# Patient Record
Sex: FEMALE | Race: WHITE
[De-identification: names, ages, dates, MRNs, and addresses within clinical notes are randomized per-mention and may not be internally consistent; named-entity substitution may affect disease eponyms.]

---

## 2019-04-04 ENCOUNTER — HOSPITAL ENCOUNTER (OUTPATIENT)
Dept: HOSPITAL 35 - TBA | Age: 54
Discharge: HOME | End: 2019-04-04
Attending: ORTHOPAEDIC SURGERY
Payer: COMMERCIAL

## 2019-04-04 VITALS — SYSTOLIC BLOOD PRESSURE: 120 MMHG | DIASTOLIC BLOOD PRESSURE: 49 MMHG

## 2019-04-04 VITALS — DIASTOLIC BLOOD PRESSURE: 83 MMHG | SYSTOLIC BLOOD PRESSURE: 139 MMHG

## 2019-04-04 VITALS — SYSTOLIC BLOOD PRESSURE: 119 MMHG | DIASTOLIC BLOOD PRESSURE: 54 MMHG

## 2019-04-04 VITALS — HEIGHT: 66 IN | BODY MASS INDEX: 28.77 KG/M2 | WEIGHT: 179 LBS

## 2019-04-04 DIAGNOSIS — Z98.890: ICD-10-CM

## 2019-04-04 DIAGNOSIS — Z90.710: ICD-10-CM

## 2019-04-04 DIAGNOSIS — H81.09: ICD-10-CM

## 2019-04-04 DIAGNOSIS — M51.26: Primary | ICD-10-CM

## 2019-04-04 DIAGNOSIS — I10: ICD-10-CM

## 2019-04-04 LAB
ANION GAP SERPL CALC-SCNC: 10 MMOL/L (ref 7–16)
BUN SERPL-MCNC: 13 MG/DL (ref 7–18)
CALCIUM SERPL-MCNC: 9 MG/DL (ref 8.5–10.1)
CHLORIDE SERPL-SCNC: 106 MMOL/L (ref 98–107)
CO2 SERPL-SCNC: 26 MMOL/L (ref 21–32)
CREAT SERPL-MCNC: 0.7 MG/DL (ref 0.6–1)
GLUCOSE SERPL-MCNC: 102 MG/DL (ref 74–106)
POTASSIUM SERPL-SCNC: 3.9 MMOL/L (ref 3.5–5.1)
SODIUM SERPL-SCNC: 142 MMOL/L (ref 136–145)

## 2019-04-04 PROCEDURE — 56525: CPT

## 2019-04-04 PROCEDURE — 62110: CPT

## 2019-04-04 PROCEDURE — 50010 RENAL EXPLORATION: CPT

## 2019-04-04 PROCEDURE — 50704: CPT

## 2019-04-04 PROCEDURE — 10783: CPT

## 2019-04-04 PROCEDURE — 50402: CPT

## 2019-04-04 PROCEDURE — 50850: CPT

## 2019-04-04 PROCEDURE — 70005: CPT

## 2019-04-04 PROCEDURE — 62900: CPT

## 2019-04-04 PROCEDURE — 50101: CPT

## 2019-04-04 NOTE — NUR
PT WALKED IN ROOM FOR A BIT W/NURSE AND SPOUSE. NOT TOLERATING PO YET,
COMPLETELY. HAS HAD JELLO AND SPRITE AND WHEN ASKED ABOUT ADVANCING DIET WAVES
HER HAND IN DISGUST, AT THIS TIME. WILL CONTINUE TO MONITOR AND HANDLE SX
W/MED ADM

## 2019-04-04 NOTE — NUR
REC PT AROUND 1040AM, A&0X4, C/O SOME NAUSEA, REPORT WAS THAT SHE'D JUST
RECEIVED TORADOL. GIVING CLEAR LIQ AND WILL ADVANCE DIET AS TOLERATED, ON 2L
PER PROTOCOL, WILL CONTINUE TO MONITOR